# Patient Record
Sex: FEMALE | Race: WHITE | NOT HISPANIC OR LATINO | URBAN - METROPOLITAN AREA
[De-identification: names, ages, dates, MRNs, and addresses within clinical notes are randomized per-mention and may not be internally consistent; named-entity substitution may affect disease eponyms.]

---

## 2021-12-18 ENCOUNTER — APPOINTMENT (OUTPATIENT)
Dept: RADIOLOGY | Facility: MEDICAL CENTER | Age: 76
End: 2021-12-18

## 2024-01-04 ENCOUNTER — OFFICE VISIT (OUTPATIENT)
Dept: VASCULAR SURGERY | Facility: MEDICAL CENTER | Age: 79
End: 2024-01-04
Payer: MEDICARE

## 2024-01-04 VITALS
HEIGHT: 63 IN | SYSTOLIC BLOOD PRESSURE: 134 MMHG | DIASTOLIC BLOOD PRESSURE: 70 MMHG | TEMPERATURE: 98.2 F | HEART RATE: 72 BPM | BODY MASS INDEX: 31.2 KG/M2 | WEIGHT: 176.1 LBS | OXYGEN SATURATION: 92 %

## 2024-01-04 DIAGNOSIS — R29.898 TRANSIENT WEAKNESS OF LOWER EXTREMITY: ICD-10-CM

## 2024-01-04 DIAGNOSIS — E66.9 OBESITY (BMI 30.0-34.9): ICD-10-CM

## 2024-01-04 DIAGNOSIS — I10 PRIMARY HYPERTENSION: ICD-10-CM

## 2024-01-04 DIAGNOSIS — E78.5 DYSLIPIDEMIA: ICD-10-CM

## 2024-01-04 PROCEDURE — 3078F DIAST BP <80 MM HG: CPT | Performed by: SURGERY

## 2024-01-04 PROCEDURE — 3075F SYST BP GE 130 - 139MM HG: CPT | Performed by: SURGERY

## 2024-01-04 PROCEDURE — 99204 OFFICE O/P NEW MOD 45 MIN: CPT | Performed by: SURGERY

## 2024-01-04 RX ORDER — ESCITALOPRAM OXALATE 10 MG/1
10 TABLET ORAL DAILY
COMMUNITY
Start: 2023-12-05

## 2024-01-04 RX ORDER — PANTOPRAZOLE SODIUM 40 MG/1
40 TABLET, DELAYED RELEASE ORAL DAILY
COMMUNITY

## 2024-01-04 RX ORDER — CELECOXIB 200 MG/1
200 CAPSULE ORAL DAILY
COMMUNITY

## 2024-01-04 RX ORDER — LORATADINE 10 MG/1
10 TABLET ORAL
COMMUNITY

## 2024-01-04 RX ORDER — ATORVASTATIN CALCIUM 10 MG/1
40 TABLET, FILM COATED ORAL DAILY
COMMUNITY

## 2024-01-04 RX ORDER — ENALAPRIL MALEATE AND HYDROCHLOROTHIAZIDE 5; 12.5 MG/1; MG/1
TABLET ORAL
COMMUNITY
Start: 2023-12-12

## 2024-01-04 RX ORDER — SEMAGLUTIDE 0.68 MG/ML
INJECTION, SOLUTION SUBCUTANEOUS
COMMUNITY
Start: 2023-10-31

## 2024-01-04 RX ORDER — VITAMIN E 268 MG
400 CAPSULE ORAL DAILY
COMMUNITY

## 2024-01-04 RX ORDER — LEVOTHYROXINE SODIUM 75 UG/1
CAPSULE ORAL DAILY
COMMUNITY

## 2024-01-04 RX ORDER — LANOLIN ALCOHOL/MO/W.PET/CERES
800 CREAM (GRAM) TOPICAL DAILY
COMMUNITY

## 2024-01-04 RX ORDER — METOPROLOL SUCCINATE 50 MG/1
TABLET, EXTENDED RELEASE ORAL
COMMUNITY
Start: 2023-11-07

## 2024-01-04 NOTE — PROGRESS NOTES
VASCULAR SURGERY SERVICE  CONSULT NOTE      Date: 1/4/2024    Referring Provider: MD Lara Spangler MD    Consulting Physician: Alma Sanchez MD - [facility]     -------------------------------------------------------------------------------------------------    Reason for consultation:  Peripheral arterial disease.    HPI:  This is a 78 y.o. female who has been having problem with pins and needle sensation from the waist down to the legs and then the legs would become numb intermittently for over a year.  The last episode was about a month ago.  Patient tells me that during that episode, she noticed that her heart rate was in the 130s.  Patient was seen by neurology as well as neurosurgery.  Further workup did not show significant compression of the spinal cord / nerve roots. CTA of the aorta and runoff was obtained and was normal.  Patient is referred to vascular service.  She denies any problem with lower extremity claudication, rest pain, or nonhealing ulceration.  She is also denies tobacco use.  She has history of TIA/stroke about a year ago.  Workup was negative for significant carotid stenosis.      Past medical history: Hypertension, dyslipidemia, and overweight.    No past surgical history on file.    Current Outpatient Medications   Medication Sig Dispense Refill    atorvastatin (LIPITOR) 10 MG Tab Take 40 mg by mouth every day.      celecoxib (CELEBREX) 200 MG Cap Take 200 mg by mouth every day.      pantoprazole (PROTONIX) 40 MG Tablet Delayed Response Take 40 mg by mouth every day.      ENALAPRIL MALEATE-HCTZ 5-12.5 MG Tab       metoprolol SR (TOPROL XL) 50 MG TABLET SR 24 HR       Levothyroxine Sodium 75 MCG Cap Take  by mouth every day.      escitalopram (LEXAPRO) 10 MG Tab Take 10 mg by mouth every day.      estradiol (CLIMARA) 0.025 MG/24HR PATCH WEEKLY       OZEMPIC, 0.25 OR 0.5 MG/DOSE, 2 MG/3ML Solution Pen-injector INJECT 0.25 MG BY SUBCUTANEOUS INJECTION EVERY 7 DAYS.       vitamin E 180 MG (400 UNIT) Cap Take 400 Units by mouth every day.      Turmeric Curcumin 500 MG Cap Take 400 mg by mouth.      loratadine (CLARITIN) 10 MG Tab Take 10 mg by mouth.      GINKGO BILOBA EXTRACT PO Take  by mouth.      GARLIC PO Take  by mouth.      folic acid (FOLVITE) 400 MCG tablet Take 800 mcg by mouth every day.      Ferrous Sulfate (IRON PO) Take  by mouth.       No current facility-administered medications for this visit.       Social History     Socioeconomic History    Marital status:      Spouse name: Not on file    Number of children: Not on file    Years of education: Not on file    Highest education level: Not on file   Occupational History    Not on file   Tobacco Use    Smoking status: Not on file    Smokeless tobacco: Not on file   Substance and Sexual Activity    Alcohol use: Not on file    Drug use: Not on file    Sexual activity: Not on file   Other Topics Concern    Not on file   Social History Narrative    Not on file     Social Determinants of Health     Financial Resource Strain: Not on file   Food Insecurity: Not on file   Transportation Needs: Not on file   Physical Activity: Not on file   Stress: Not on file   Social Connections: Not on file   Intimate Partner Violence: Not on file   Housing Stability: Not on file       No family history on file.    Allergies:  Penicillins    Review of Systems:    Constitutional: Negative for fever, chills, weight loss,   HENT:   Negative for hearing loss or tinnitus    Eyes:    Negative for blurred vision, double vision, or loss of vision  Respiratory:  Negative for cough, hemoptysis, or wheezing    Cardiac:  Negative for chest pain or palpitations or orthopnea  Vascular:  Negative for claudication or rest pain   Gastrointestinal: Negative for nausea, vomiting, or abdominal pain     Negative for hematochezia or melena   Genitourinary: Negative for dysuria, frequency, or hematuria   Musculoskeletal: Negative for myalgias, back pain,  "or joint pain  Skin:   Negative for itching or rash  Neurological:  Negative for dizziness, headaches, or tremors     Negative for speech disturbance     Negative for extremity weakness or paresthesias  Endo/Heme:  Negative for easy bruising or bleeding  Psychiatric:  Negative for depression, suicidal ideas, or hallucinations    Physical Exam:  /70 (BP Location: Right arm, Patient Position: Sitting, BP Cuff Size: Adult)   Pulse 72   Temp 36.8 °C (98.2 °F) (Temporal)   Ht 1.6 m (5' 3\")   Wt 79.9 kg (176 lb 1.6 oz)   SpO2 92%     Constitutional: Alert, oriented, no acute distress  HEENT:  Normocephalic and atraumatic, EOMI  Neck:   Supple, no JVD, no bruits.  Cardiovascular: Regular rate and rhythm  Pulmonary:  Good air entry bilaterally  Abdominal:  Soft, non-tender, non-distended     Aortic impulse not widened  Musculoskeletal: No edema, no tenderness  Neurological:  CN II-XII grossly intact, no focal deficits  Skin:   Skin is warm and dry. No rash noted.  Psychiatric:  Normal mood and affect.  Lower extremities:   Palpable bilateral common femoral, popliteal, and pedal pulses with multiphasic flow.  No ulceration.    Labs:  Reviewed.    Radiology:  Reviewed.    Assessment:  -Intermittent bilateral lateral lower extremity paresthesia and numbness.  -Hypertension.  -Dyslipidemia.  -Overweight.    Plan:  I had a long discussion with patient.  Study results were reviewed with her.  Based on CT angiogram and physical exam, the circulation of both of her lower extremities is intact.  Her symptoms are also more consistent with neurologic issue rather than vascular issue.  I asked patient to follow-up with her primary care provider for further workup of her problem and possibly a referral to cardiologist since with patient having tachycardia during the last episode, she may benefit from seeing a cardiologist and have a Holter monitor.  Patient indicated understanding and agreed with plan.  All questions were " answered.  Since I do not have anything else to offer patient, I will see her back as needed.      Alma Sanchez MD  [facility]   [place] preferred or call my office [phone]  __________________________________________________________________  Patient:Aylin Nguyen   MRN:[id number]   CSN:[id number]